# Patient Record
Sex: MALE | Race: BLACK OR AFRICAN AMERICAN | URBAN - METROPOLITAN AREA
[De-identification: names, ages, dates, MRNs, and addresses within clinical notes are randomized per-mention and may not be internally consistent; named-entity substitution may affect disease eponyms.]

---

## 2017-05-02 PROBLEM — Z00.00 WELL ADULT HEALTH CHECK: Status: ACTIVE | Noted: 2017-05-02

## 2017-05-03 ENCOUNTER — OFFICE VISIT (OUTPATIENT)
Dept: INTERNAL MEDICINE CLINIC | Age: 22
End: 2017-05-03

## 2017-05-03 VITALS — DIASTOLIC BLOOD PRESSURE: 78 MMHG | OXYGEN SATURATION: 96 % | SYSTOLIC BLOOD PRESSURE: 124 MMHG | HEART RATE: 70 BPM

## 2017-05-03 DIAGNOSIS — M54.50 CHRONIC MIDLINE LOW BACK PAIN WITHOUT SCIATICA: ICD-10-CM

## 2017-05-03 DIAGNOSIS — G89.29 CHRONIC MIDLINE LOW BACK PAIN WITHOUT SCIATICA: ICD-10-CM

## 2017-05-03 DIAGNOSIS — Z00.00 WELL ADULT HEALTH CHECK: Primary | ICD-10-CM

## 2017-05-03 PROCEDURE — 99204 OFFICE O/P NEW MOD 45 MIN: CPT | Performed by: INTERNAL MEDICINE

## 2017-05-03 PROCEDURE — 4004F PT TOBACCO SCREEN RCVD TLK: CPT | Performed by: INTERNAL MEDICINE

## 2017-05-03 PROCEDURE — G8427 DOCREV CUR MEDS BY ELIG CLIN: HCPCS | Performed by: INTERNAL MEDICINE

## 2017-05-03 PROCEDURE — G8421 BMI NOT CALCULATED: HCPCS | Performed by: INTERNAL MEDICINE

## 2018-09-26 PROBLEM — Z00.00 WELL ADULT HEALTH CHECK: Status: RESOLVED | Noted: 2017-05-02 | Resolved: 2018-09-26

## 2020-08-17 ENCOUNTER — NURSE TRIAGE (OUTPATIENT)
Dept: OTHER | Facility: CLINIC | Age: 25
End: 2020-08-17

## 2020-08-17 NOTE — TELEPHONE ENCOUNTER
Left leg pain. Below the calf and above the heel. Not sure exactly what certain point. No change in color of skin, it may have a little swelling. It started hurting a month and a half ago. He does physical activity, this is when her hurt it. He tried to let it ware off. When he went back it still bothers him to do any jumping or running. Reason for Disposition   Patient wants to be seen    Answer Assessment - Initial Assessment Questions  1. ONSET: \"When did the pain start? \"       A month and a half ago. Left leg pain. Below the calf and above the heel. Not sure exactly what certain point. No change in color of skin, it may have a little swelling. It started hurting a month and a half ago. He does physical activity, this is when her hurt it. He tried to let it ware off. When he went back it still bothers him to do any jumping or running. 2. LOCATION: \"Where is the pain located? \"       Left leg below calf. 3. PAIN: \"How bad is the pain? \"    (Scale 1-10; or mild, moderate, severe)    -  MILD (1-3): doesn't interfere with normal activities     -  MODERATE (4-7): interferes with normal activities (e.g., work or school) or awakens from sleep, limping     -  SEVERE (8-10): excruciating pain, unable to do any normal activities, unable to walk      9/10 activity,  He can feel it throbbing when he is off of it. It feels like it is a muscle    4. WORK OR EXERCISE: \"Has there been any recent work or exercise that involved this part of the body? \"       Exercise    5. CAUSE: \"What do you think is causing the leg pain? \"      He was doing he was on a football field, he was running and turning and he felt a pop. It felt like something hit him in the leg. But it was him changing direction. He has iced it, ibuprofen, resting it. When he tried running and changed direction again this past saturday he felt the pop again. 6. OTHER SYMPTOMS: \"Do you have any other symptoms? \" (e.g., chest pain, back pain, breathing difficulty, swelling, rash, fever, numbness, weakness)        7. PREGNANCY: \"Is there any chance you are pregnant? \" \"When was your last menstrual period? \"      N/a    Protocols used: LEG PAIN-ADULT-[place]    Pod 1    Received call from [address]. Call soft transferred to [address] to schedule appointment. Please do not reply to the triage nurse through this encounter. Any subsequent communication should be directly with the patient.

## 2020-08-18 PROBLEM — M54.50 CHRONIC MIDLINE LOW BACK PAIN WITHOUT SCIATICA: Status: RESOLVED | Noted: 2017-05-03 | Resolved: 2020-08-18

## 2020-08-18 PROBLEM — G89.29 CHRONIC MIDLINE LOW BACK PAIN WITHOUT SCIATICA: Status: RESOLVED | Noted: 2017-05-03 | Resolved: 2020-08-18

## 2020-08-19 ENCOUNTER — OFFICE VISIT (OUTPATIENT)
Dept: PRIMARY CARE CLINIC | Age: 25
End: 2020-08-19
Payer: COMMERCIAL

## 2020-08-19 VITALS
SYSTOLIC BLOOD PRESSURE: 135 MMHG | WEIGHT: 251.4 LBS | HEIGHT: 71 IN | DIASTOLIC BLOOD PRESSURE: 79 MMHG | TEMPERATURE: 97.9 F | HEART RATE: 77 BPM | BODY MASS INDEX: 35.19 KG/M2

## 2020-08-19 PROCEDURE — 99385 PREV VISIT NEW AGE 18-39: CPT | Performed by: STUDENT IN AN ORGANIZED HEALTH CARE EDUCATION/TRAINING PROGRAM

## 2020-08-19 PROCEDURE — 99203 OFFICE O/P NEW LOW 30 MIN: CPT | Performed by: STUDENT IN AN ORGANIZED HEALTH CARE EDUCATION/TRAINING PROGRAM

## 2020-08-19 ASSESSMENT — PATIENT HEALTH QUESTIONNAIRE - PHQ9
1. LITTLE INTEREST OR PLEASURE IN DOING THINGS: 0
SUM OF ALL RESPONSES TO PHQ QUESTIONS 1-9: 0
SUM OF ALL RESPONSES TO PHQ QUESTIONS 1-9: 0
SUM OF ALL RESPONSES TO PHQ9 QUESTIONS 1 & 2: 0
2. FEELING DOWN, DEPRESSED OR HOPELESS: 0

## 2020-08-19 ASSESSMENT — ENCOUNTER SYMPTOMS
BLOOD IN STOOL: 0
SHORTNESS OF BREATH: 0
COUGH: 0

## 2020-08-19 NOTE — PROGRESS NOTES
2020    Sarah Washington (:  1995) is a 30 y.o. male, here for evaluation of the following medical concerns:    HPI    Sexual activity: single partner, contraception - condoms   Last eye exam: WNL  Last Dental exam: WNL  Diet: Healthy  Exercise: aerobics 3 time(s) per week and weight training  3 time(s) per week  Seatbelt use: yes  Helmet use: yes  Sunscreen: yes    Review of Systems   Constitutional: Negative for activity change, fatigue and unexpected weight change. HENT: Negative for hearing loss. Eyes: Negative for visual disturbance. Respiratory: Negative for cough and shortness of breath. Cardiovascular: Negative for chest pain and leg swelling. Gastrointestinal: Negative for blood in stool. Endocrine: Negative for polydipsia and polyphagia. Genitourinary: Negative for dysuria and frequency. Musculoskeletal: Positive for gait problem (due to L calf pain). Negative for arthralgias. Skin: Negative for rash. Allergic/Immunologic: Negative for environmental allergies. Neurological: Negative for dizziness and headaches. Hematological: Does not bruise/bleed easily. Psychiatric/Behavioral: Negative for dysphoric mood and sleep disturbance. The patient is not nervous/anxious.         Prior to Visit Medications    Not on File        No Known Allergies    Past Medical History:   Diagnosis Date    Chronic midline low back pain without sciatica 5/3/2017    2 years, worse in AM    Torn ACL     complete tear right knee       Past Surgical History:   Procedure Laterality Date    ANTERIOR CRUCIATE LIGAMENT REPAIR         Social History     Socioeconomic History    Marital status: Single     Spouse name: Not on file    Number of children: Not on file    Years of education: Not on file    Highest education level: Not on file   Occupational History    Not on file   Social Needs    Financial resource strain: Not on file    Food insecurity     Worry: Not on file     Inability: Rate and Rhythm: Normal rate and regular rhythm. Pulses: Normal pulses. Pulmonary:      Effort: Pulmonary effort is normal.      Breath sounds: Normal breath sounds. Abdominal:      General: Abdomen is flat. Bowel sounds are normal.      Palpations: Abdomen is soft. Musculoskeletal: Normal range of motion. General: Tenderness (with plapation of the calf just above origin of achilles) present. No deformity. Skin:     General: Skin is warm and dry. Capillary Refill: Capillary refill takes less than 2 seconds. Findings: No rash. Neurological:      General: No focal deficit present. Mental Status: He is alert and oriented to person, place, and time. Mental status is at baseline. Sensory: No sensory deficit. Motor: Weakness (L>R; when asked to perform calf raise clearly decreased strnght compared to R) present. Gait: Gait abnormal (antalgic gait). Psychiatric:         Mood and Affect: Mood normal.         Separate Identifiable issues addressed today:  Calf Pain: Patient presents today complaining of Left sided calf pain. States that about two months ago he was playing intramural football when he made a lateral move and felt sudden pain of his L calf and heard a \"pop. \" He immediately stopped playing. He has persistent pain since, but about a week ago he though it was feeling better, so he attempted playing gain. Shortly after starting to play again he began experiencing the same pain. He is able to walk although somewhat uncomfortable and he noticed weakness with stairs. ASSESSMENT/PLAN:  Ana Rosa Hankins was seen today for new patient and leg pain. Diagnoses and all orders for this visit:    Encounter for medical examination to establish care: Healthy lifestyle. Encouraged continuation. Unsure about vaccines; will obtain records. Injury of calf: mechanism of injury is concerning and patient has high tolerance for pain, which makes presentation concerning. Initially leaning towards pull or strain, but would have expected this to have improved during 2 month rest period. Patient would like to return to active lifestyle (playing rec bball, football), but currently has minimal explosiveness. Refer for evaluation.  -     Ambulatory referral to Orthopedic Surgery        Return in about 1 year (around 8/19/2021). An  electronic signature was used to authenticate this note.     --Maira Cobb, DO on 8/19/2020 at 5:14 PM

## 2020-08-19 NOTE — PATIENT INSTRUCTIONS

## 2020-08-21 ENCOUNTER — TELEPHONE (OUTPATIENT)
Dept: ORTHOPEDIC SURGERY | Age: 25
End: 2020-08-21

## 2020-08-21 ENCOUNTER — OFFICE VISIT (OUTPATIENT)
Dept: ORTHOPEDIC SURGERY | Age: 25
End: 2020-08-21
Payer: COMMERCIAL

## 2020-08-21 VITALS — WEIGHT: 251 LBS | HEIGHT: 71 IN | BODY MASS INDEX: 35.14 KG/M2

## 2020-08-21 PROCEDURE — L4361 PNEUMA/VAC WALK BOOT PRE OTS: HCPCS | Performed by: ORTHOPAEDIC SURGERY

## 2020-08-21 PROCEDURE — 99243 OFF/OP CNSLTJ NEW/EST LOW 30: CPT | Performed by: ORTHOPAEDIC SURGERY

## 2020-08-21 NOTE — PROGRESS NOTES
I am evaluating this patient as a consult at the request of Kacey Cabezas DO  [address]  [address], [address]     Chief Complaint:  New Patient (L calf pain/popping )      History of Present Illness:  Rosie Vicente is a 30 y.o. male here regarding a left calf injury, patient plays semi-pro football and states on June 6 he was pivoting when he felt a pop in the posterior aspect of his calf. He was unable to continue playing. He treated himself conservatively with anti-inflammatories and activity modification. Continues to have pain with walking, currently 8 out of 10. He endorses weakness with heel raise. Attempted to play football this past week without success. He was evaluated by his PCP who encouraged to follow-up with orthopedics. Patient denies numbness and tingling down the leg. He had an ACL reconstruction to the right knee 1 year ago and has been doing rehab to return to football.     Pain Assessment:       Medical History:  Past Medical History:   Diagnosis Date    Chronic midline low back pain without sciatica 5/3/2017    2 years, worse in AM    Torn ACL     complete tear right knee     Past Surgical History:   Procedure Laterality Date    ANTERIOR CRUCIATE LIGAMENT REPAIR       Social History     Socioeconomic History    Marital status: Single     Spouse name: None    Number of children: None    Years of education: None    Highest education level: None   Occupational History    None   Social Needs    Financial resource strain: None    Food insecurity     Worry: None     Inability: None    Transportation needs     Medical: None     Non-medical: None   Tobacco Use    Smoking status: Former Smoker     Packs/day: 0.00     Years: 5.00     Pack years: 0.00    Smokeless tobacco: Never Used    Tobacco comment: 4 years ago   Substance and Sexual Activity    Alcohol use: Yes     Comment: 2 times a month    Drug use: Never    Sexual activity: Yes     Partners: Female Lifestyle    Physical activity     Days per week: None     Minutes per session: None    Stress: None   Relationships    Social connections     Talks on phone: None     Gets together: None     Attends Hoahaoism service: None     Active member of club or organization: None     Attends meetings of clubs or organizations: None     Relationship status: None    Intimate partner violence     Fear of current or ex partner: None     Emotionally abused: None     Physically abused: None     Forced sexual activity: None   Other Topics Concern    None   Social History Narrative    None     No current outpatient medications on file. No current facility-administered medications for this visit. No Known Allergies    Review of Systems:  Constitutional: negative  Respiratory: negative  Cardiovascular: negative  Musculoskeletal:negative except for New Patient (L calf pain/popping )    Relevant review of systems reviewed and available in the patient's chart in media tab    Vital Signs:  Vitals:    08/21/20 0925   Weight: 251 lb (113.9 kg)   Height: 5' 11\" (1.803 m)           General Exam:   Constitutional: Patient is adequately groomed with no evidence of malnutrition  Mental Status: The patient is oriented to time, place and person. The patient's mood and affect are appropriate. Vascular: Examination reveals no swelling or calf tenderness. Peripheral pulses are palpable and 2+. Calf  Examination  Inspection:   No gross deformities noted. mild swelling noted. No erythema or ecchymosis. Skin is intact. Intact sensation to light touch throughout the foot and good pedal pulses. Palpation: He has tenderness to palpation at the musculoskeletal tendinous junction, defect laterally is palpable. Achilles tendon is intact. Patient has pain and significant weakness with single leg heel raise, is barely able to raise heel off the ground. Ambulates with a mildly antalgic gait.  negative Tenderness to palpation along the medial malleolus and deltoid, negative Tenderness to palpation along lateral malleolus, negative Tenderness to palpation along ATFL, negative tenderness along the 5th metatarsal, Navicular or Lis Franc joint. Range of Motion:   Examination of both ankles showing a good range of motion. He has dorsiflexion to about 10 degrees bilaterally, which increased with knee flexion. Strength: 5/5 dorsiflexion, inversion and eversion   4/5 strength with plantar flexion    Special Tests: The ankles are stable to anterior drawer and talar tilt test bilaterally, equally. normal Jaimess test  negative Peroneal tendon dislocation, squeeze test, and Graces test    Skin: There are no rashes, ulcerations or lesions. Reflex: not tested    Additional Examinations:  Right Lower Extremity: Examination of the right lower extremity does not show any tenderness, deformity or injury. Range of motion is unremarkable. There is no gross instability. There are no rashes, ulcerations or lesions. Strength and tone are normal.      LUMBAR SPINE: The skin is warm and dry. There is no swelling, warmth, or erythema. Range of motion is within normal limits. There is no paraspinal or spinous process tenderness. Ipsilateral and contralateral straight leg raising tests are negative. The distal neurovascular exam is grossly intact and symmetric. X-RAYS: 2 views AP, Lateral, of the left tibia were obtained and reviewed, they show no periosteal reaction, medullary lesions, or osteopenia. Joint spaces are well maintained. No evidence of fracture or dislocation. Assessment : Left gastrocnemius strain, concern for tear    Impression:  Encounter Diagnosis   Name Primary?     Pain of left calf Yes       Office Procedures:  Orders Placed This Encounter   Procedures    XR TIBIA FIBULA LEFT (2 VIEWS)     Standing Status:   Future     Number of Occurrences:   1     Standing Expiration Date:   8/21/2021    MRI TIBIA FIBULA LEFT WO CONTRAST     Standing Status:   Future     Standing Expiration Date:   8/21/2021     Scheduling Instructions:      [address] [place] [address] Way, [id number], [address]            PHONE: [phone]                  **PUSH IMAGES TO [facility] PACS**            PENDING APPROVAL FROM PRE-CERT DEPARTMENT             TIME AND DATE PATIENT AVAILABILITY:             Really early or really late MRI Monday-Friday     Order Specific Question:   Reason for exam:     Answer:   r/o left gastrocnemius tear    Breg Tall Shona Walking Boot     Patient was prescribed a Breg Tall Shona Walking Boot. The left leg will require stabilization / immobilization from this semi-rigid / rigid orthosis to improve their function. The orthosis will assist in protecting the affected area, provide functional support and facilitate healing. Patient was instructed to progress ambulation weight bearing as tolerated in the device. The patient was educated and fit by a healthcare professional with expert knowledge and specialization in brace application while under the direct supervision of the physician. Verbal and written instructions for the use of and application of this item were provided. They were instructed to contact the office immediately should the brace result in increased pain, decreased sensation, increased swelling or worsening of the condition. Procedures    Breg Tall Shona Walking Boot     Patient was prescribed a Breg Tall Shona Walking Boot. The left leg will require stabilization / immobilization from this semi-rigid / rigid orthosis to improve their function. The orthosis will assist in protecting the affected area, provide functional support and facilitate healing. Patient was instructed to progress ambulation weight bearing as tolerated in the device.      The patient was educated and fit by a healthcare professional with expert knowledge and specialization in brace application while under the direct supervision of the physician. Verbal and written instructions for the use of and application of this item were provided. They were instructed to contact the office immediately should the brace result in increased pain, decreased sensation, increased swelling or worsening of the condition. Treatment Plan:      The xray findings were reviewed with the patient and explained to his that the pain was likely secondary to gastrocnemius strain or tear. I will obtain an MRI to further evaluate to help dictate prognosis and treatment options. Walking boot was provided today for comfort. Follow-up after MRI.     Jadyn Sanders

## 2020-08-24 ENCOUNTER — TELEPHONE (OUTPATIENT)
Dept: ORTHOPEDIC SURGERY | Age: 25
End: 2020-08-24

## 2020-08-24 NOTE — TELEPHONE ENCOUNTER
Called & talked with the patient informing him that his MRI was approved & scheduled with [facility] at [facility] at [address] [place] [address] arrival time for 6:15AM scan time on August 31st, patient said this worked for him and he went ahead and had his f/u appointment with doctor bedolla scheduled for 9/4/2020 at [facility] at [facility].

## 2020-09-04 ENCOUNTER — OFFICE VISIT (OUTPATIENT)
Dept: ORTHOPEDIC SURGERY | Age: 25
End: 2020-09-04
Payer: COMMERCIAL

## 2020-09-04 VITALS — WEIGHT: 251 LBS | BODY MASS INDEX: 35.14 KG/M2 | HEIGHT: 71 IN

## 2020-09-04 PROBLEM — S86.112A: Status: ACTIVE | Noted: 2020-09-04

## 2020-09-04 PROCEDURE — 99213 OFFICE O/P EST LOW 20 MIN: CPT | Performed by: ORTHOPAEDIC SURGERY
